# Patient Record
Sex: FEMALE | Race: WHITE | NOT HISPANIC OR LATINO
[De-identification: names, ages, dates, MRNs, and addresses within clinical notes are randomized per-mention and may not be internally consistent; named-entity substitution may affect disease eponyms.]

---

## 2017-01-18 ENCOUNTER — APPOINTMENT (OUTPATIENT)
Dept: OPHTHALMOLOGY | Facility: AMBULATORY SURGERY CENTER | Age: 82
End: 2017-01-18

## 2017-01-19 ENCOUNTER — APPOINTMENT (OUTPATIENT)
Dept: OPHTHALMOLOGY | Facility: CLINIC | Age: 82
End: 2017-01-19

## 2019-09-12 ENCOUNTER — EMERGENCY (EMERGENCY)
Facility: HOSPITAL | Age: 84
LOS: 1 days | Discharge: HOME | End: 2019-09-12
Admitting: EMERGENCY MEDICINE
Payer: MEDICARE

## 2019-09-12 VITALS
OXYGEN SATURATION: 98 % | DIASTOLIC BLOOD PRESSURE: 76 MMHG | WEIGHT: 123.02 LBS | RESPIRATION RATE: 16 BRPM | SYSTOLIC BLOOD PRESSURE: 137 MMHG | HEART RATE: 82 BPM | TEMPERATURE: 98 F

## 2019-09-12 VITALS
HEART RATE: 85 BPM | RESPIRATION RATE: 16 BRPM | SYSTOLIC BLOOD PRESSURE: 139 MMHG | OXYGEN SATURATION: 97 % | TEMPERATURE: 98 F | DIASTOLIC BLOOD PRESSURE: 75 MMHG
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
## 2019-09-12 DIAGNOSIS — S52.501A UNSPECIFIED FRACTURE OF THE LOWER END OF RIGHT RADIUS, INITIAL ENCOUNTER FOR CLOSED FRACTURE: ICD-10-CM
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
## 2019-09-12 DIAGNOSIS — M79.642 PAIN IN LEFT HAND: ICD-10-CM

## 2019-09-12 PROCEDURE — 73130 X-RAY EXAM OF HAND: CPT | Mod: 26,LT

## 2019-09-12 PROCEDURE — 73110 X-RAY EXAM OF WRIST: CPT | Mod: 26,LT,76

## 2019-09-12 PROCEDURE — 99212 OFFICE O/P EST SF 10 MIN: CPT

## 2019-09-12 PROCEDURE — 73130 X-RAY EXAM OF HAND: CPT | Mod: 26

## 2019-09-12 PROCEDURE — 73110 X-RAY EXAM OF WRIST: CPT

## 2019-09-12 PROCEDURE — 73110 X-RAY EXAM OF WRIST: CPT | Mod: 26

## 2019-09-12 PROCEDURE — 99284 EMERGENCY DEPT VISIT MOD MDM: CPT | Mod: 25

## 2019-09-12 PROCEDURE — 73130 X-RAY EXAM OF HAND: CPT

## 2019-09-12 PROCEDURE — 99285 EMERGENCY DEPT VISIT HI MDM: CPT | Mod: 25

## 2019-09-12 PROCEDURE — 25605 CLTX DST RDL FX/EPHYS SEP W/: CPT | Mod: LT

## 2019-09-12 RX ORDER — OXYCODONE AND ACETAMINOPHEN 5; 325 MG/1; MG/1
1 TABLET ORAL ONCE
Refills: 0 | Status: DISCONTINUED | OUTPATIENT
Start: 2019-09-12 | End: 2019-09-12

## 2019-09-12 RX ORDER — OXYCODONE AND ACETAMINOPHEN 5; 325 MG/1; MG/1
1 TABLET ORAL
Qty: 16 | Refills: 0
Start: 2019-09-12 | End: 2019-09-15

## 2019-09-12 RX ADMIN — OXYCODONE AND ACETAMINOPHEN 1 TABLET(S): 5; 325 TABLET ORAL at 12:56

## 2019-09-12 RX ADMIN — OXYCODONE AND ACETAMINOPHEN 1 TABLET(S): 5; 325 TABLET ORAL at 11:56

## 2019-09-12 NOTE — ED PROVIDER NOTE - CLINICAL SUMMARY MEDICAL DECISION MAKING FREE TEXT BOX
pt s/p FOOSH - injured L wrist - sustained complex fx hence ortho consulted for reduction, no head trauma or other injuries, neurovascular intact, to f/u w/ortho, will dc w/pain meds, pt understands to return for any swelling/bruising/numbness

## 2019-09-12 NOTE — ED PROVIDER NOTE - DIAGNOSTIC INTERPRETATION
wrist: + impacted distal radius fx, + ulnar styloid fx, + STS, no dislocation \  hand: + wrist fx as above, no metacarpal fx

## 2019-09-12 NOTE — ED PROVIDER NOTE - NSFOLLOWUPINSTRUCTIONS_ED_ALL_ED_FT
wrist fracture  splint must stay on until orthopedic follow up next week, rest, ice, elevate, pain medication as needed  return immediately for any bruising, swelling, numbness, worsening pain    R.I.C.E. Treatment  WHAT YOU NEED TO KNOW:  R.I.C.E. treatment is a 4-step process used to decrease swelling and pain caused by an injury. R.I.C.E. stands for rest, ice, compression, and elevation. R.I.C.E. should be done within 24 to 48 hours after an injury.Ice and Elevation  Ice and Elevation  DISCHARGE INSTRUCTIONS:  Return to the emergency department if:   Your pain is severe.  You have severe swelling or deformity.  You have numbness in the injured area.  Contact your healthcare provider if:  Your pain and swelling does not go away after a few days.  You have questions or concerns about your condition or care.  How to use R.I.C.E. treatment:   Rest your injured area as directed. You may need to stop using, or keep weight off, the injury for 48 hours or longer. Your healthcare provider may recommend crutches or another device. Return to your usual activities as directed.   Apply ice on your injured area for 15 to 20 minutes every 4 hours or as directed. Use an ice pack, or put crushed ice in a plastic bag. Cover it with a towel. Ice helps prevent tissue damage and decreases swelling and pain.  Compress, or keep pressure on, the injured area. Compression will help decrease swelling and support the injured area. Use an elastic bandage, air stirrup, splint, or sling as directed. If you use an elastic bandage to wrap your injured area, make sure the bandage is not too tight.   Elevate the injured area above the level of your heart as often as you can. This will help decrease swelling and pain. Prop the injured area on pillows or blankets to keep it elevated comfortably.

## 2019-09-12 NOTE — ED PROVIDER NOTE - PATIENT PORTAL LINK FT
You can access the FollowMyHealth Patient Portal offered by [facility] by registering at the following website: [email or web address]. By joining [employer]’s FollowMyHealth portal, you will also be able to view your health information using other applications (apps) compatible with our system.

## 2019-09-12 NOTE — ED PROVIDER NOTE - CARE PROVIDER_API CALL
Cralos Salmeron)  Orthopaedic Surgery  [address], [address] [place] [address]  Phone: [phone]  Fax: [phone]  Follow Up Time:

## 2019-09-12 NOTE — ED ADULT NURSE NOTE - NSIMPLEMENTINTERV_GEN_ALL_ED
Implemented All Universal Safety Interventions:  Jeffrey to call system. Call bell, personal items and telephone within reach. Instruct patient to call for assistance. Room bathroom lighting operational. Non-slip footwear when patient is off stretcher. Physically safe environment: no spills, clutter or unnecessary equipment. Stretcher in lowest position, wheels locked, appropriate side rails in place.

## 2019-09-12 NOTE — ED PROVIDER NOTE - OBJECTIVE STATEMENT
The pt is a 89 y/o F, who presents to ED c/o L wrist pain s/p STEWOSH last night. Pt states pain is 8/10, constant and throbbing, has not taken any pain meds, decreased ROM, is R hand dominant. Denies head trauma, loc, neck or back pain, dizziness prior to fall, numbness or tingling to fingers, elbow pain

## 2019-09-12 NOTE — CONSULT NOTE ADULT - SUBJECTIVE AND OBJECTIVE BOX
Orthopaedic Surgery Consult Note    For Surgeon: Dr. Salmeron    HPI:  89yFemale RHD c/o Left wrist pain s/p mechanical fall in apartment overnight. Hx Left distal radius fx years ago treated nonoperatively in cast. Pt states tripped on carpet overnight fell directly onto Left wrist. Took Ayla Aspirin for pain. Denies other injuries, numbness, tingling, head trauma, LOC.      Allergies    No Known Allergies      PAST MEDICAL & SURGICAL HISTORY:    MEDICATIONS  (STANDING):    MEDICATIONS  (PRN):      Vital Signs Last 24 Hrs  T(C): 36.7 (12 Sep 2019 14:43), Max: 36.7 (12 Sep 2019 14:43)  T(F): 98 (12 Sep 2019 14:43), Max: 98 (12 Sep 2019 14:43)  HR: 85 (12 Sep 2019 14:43) (62 - 85)  BP: 139/75 (12 Sep 2019 14:43) (137/76 - 145/52)  BP(mean): --  RR: 16 (12 Sep 2019 14:43) (16 - 16)  SpO2: 97% (12 Sep 2019 14:43) (97% - 99%)    Physical Exam:  Left wrist skin intact, +deformity, +swelling, +ecchymosis  +TTP distal radius  sensation intact distally  motor intact distally  pulses intact      Imaging: Xray Left wrist +distal radius & ulna styloid fx    Procedure: using aseptic technique injected 10cc 1% lidocaine into left wrist for hematoma block. reduction performed sugartong splint applied. pt tolerated procedure well, neurovasc intact afterwards.    A/P: 89yFemale with Left distal radius fx  -post reduction xrays reviewed  -sling/NWB  -ice/elevation  -pain control  -Discussed with Dr. Salmeron, f/u in office next week    Ortho Pager [phone]

## 2019-09-12 NOTE — ED ADULT NURSE NOTE - OBJECTIVE STATEMENT
Patient presents to the ED complaining of falling down yesterday night and landing on her left hand. Left handed noted to be swollen. Patient states that she had a break to the same hand a few years ago.

## 2019-09-12 NOTE — ED PROVIDER NOTE - MUSCULOSKELETAL, MLM
L UE: no shoulder or elbow tend, no forearm tend, + mod swelling and ecchymosis over dorsal wrist, + tend, + swelling over metacarpals, radial pulse 2+, able to wiggle all fingers

## 2023-06-20 ENCOUNTER — APPOINTMENT (OUTPATIENT)
Dept: OPHTHALMOLOGY | Facility: CLINIC | Age: 88
End: 2023-06-20
Payer: MEDICARE

## 2023-06-20 ENCOUNTER — NON-APPOINTMENT (OUTPATIENT)
Age: 88
End: 2023-06-20

## 2023-06-20 PROCEDURE — 92136 OPHTHALMIC BIOMETRY: CPT

## 2023-06-20 PROCEDURE — 92004 COMPRE OPH EXAM NEW PT 1/>: CPT

## 2023-06-20 PROCEDURE — 92134 CPTRZ OPH DX IMG PST SGM RTA: CPT

## 2023-06-29 ENCOUNTER — APPOINTMENT (OUTPATIENT)
Dept: OPHTHALMOLOGY | Facility: CLINIC | Age: 88
End: 2023-06-29
Payer: MEDICARE

## 2023-06-29 ENCOUNTER — NON-APPOINTMENT (OUTPATIENT)
Age: 88
End: 2023-06-29

## 2023-06-29 PROCEDURE — 92201 OPSCPY EXTND RTA DRAW UNI/BI: CPT

## 2023-06-29 PROCEDURE — 92134 CPTRZ OPH DX IMG PST SGM RTA: CPT

## 2023-06-29 PROCEDURE — 92014 COMPRE OPH EXAM EST PT 1/>: CPT

## 2023-07-05 ENCOUNTER — NON-APPOINTMENT (OUTPATIENT)
Age: 88
End: 2023-07-05

## 2023-07-05 ENCOUNTER — APPOINTMENT (OUTPATIENT)
Dept: OPHTHALMOLOGY | Facility: CLINIC | Age: 88
End: 2023-07-05
Payer: MEDICARE

## 2023-07-05 PROCEDURE — 92134 CPTRZ OPH DX IMG PST SGM RTA: CPT

## 2023-07-05 PROCEDURE — 92012 INTRM OPH EXAM EST PATIENT: CPT | Mod: 25

## 2023-07-05 PROCEDURE — 67028 INJECTION EYE DRUG: CPT | Mod: RT

## 2023-07-10 ENCOUNTER — NON-APPOINTMENT (OUTPATIENT)
Age: 88
End: 2023-07-10

## 2023-07-10 ENCOUNTER — APPOINTMENT (OUTPATIENT)
Dept: OPHTHALMOLOGY | Facility: CLINIC | Age: 88
End: 2023-07-10
Payer: MEDICARE

## 2023-07-10 PROCEDURE — 92134 CPTRZ OPH DX IMG PST SGM RTA: CPT

## 2023-07-10 PROCEDURE — 67028 INJECTION EYE DRUG: CPT | Mod: LT

## 2023-08-02 ENCOUNTER — APPOINTMENT (OUTPATIENT)
Dept: OPHTHALMOLOGY | Facility: CLINIC | Age: 88
End: 2023-08-02
Payer: MEDICARE

## 2023-08-02 ENCOUNTER — NON-APPOINTMENT (OUTPATIENT)
Age: 88
End: 2023-08-02

## 2023-08-02 PROCEDURE — 67028 INJECTION EYE DRUG: CPT | Mod: RT

## 2023-08-02 PROCEDURE — 92134 CPTRZ OPH DX IMG PST SGM RTA: CPT

## 2023-08-07 ENCOUNTER — NON-APPOINTMENT (OUTPATIENT)
Age: 88
End: 2023-08-07

## 2023-08-07 ENCOUNTER — APPOINTMENT (OUTPATIENT)
Dept: OPHTHALMOLOGY | Facility: CLINIC | Age: 88
End: 2023-08-07
Payer: MEDICARE

## 2023-08-07 PROCEDURE — 67028 INJECTION EYE DRUG: CPT | Mod: LT

## 2023-08-07 PROCEDURE — 92134 CPTRZ OPH DX IMG PST SGM RTA: CPT

## 2023-08-30 ENCOUNTER — NON-APPOINTMENT (OUTPATIENT)
Age: 88
End: 2023-08-30

## 2023-08-30 ENCOUNTER — APPOINTMENT (OUTPATIENT)
Dept: OPHTHALMOLOGY | Facility: CLINIC | Age: 88
End: 2023-08-30
Payer: MEDICARE

## 2023-08-30 PROCEDURE — 67028 INJECTION EYE DRUG: CPT | Mod: RT

## 2023-08-30 PROCEDURE — 92134 CPTRZ OPH DX IMG PST SGM RTA: CPT

## 2023-09-07 ENCOUNTER — NON-APPOINTMENT (OUTPATIENT)
Age: 88
End: 2023-09-07

## 2023-09-07 ENCOUNTER — APPOINTMENT (OUTPATIENT)
Dept: OPHTHALMOLOGY | Facility: CLINIC | Age: 88
End: 2023-09-07
Payer: MEDICARE

## 2023-09-07 PROCEDURE — 92134 CPTRZ OPH DX IMG PST SGM RTA: CPT

## 2023-09-07 PROCEDURE — 67028 INJECTION EYE DRUG: CPT | Mod: LT

## 2023-10-04 ENCOUNTER — APPOINTMENT (OUTPATIENT)
Dept: OPHTHALMOLOGY | Facility: CLINIC | Age: 88
End: 2023-10-04

## 2023-10-09 ENCOUNTER — APPOINTMENT (OUTPATIENT)
Dept: OPHTHALMOLOGY | Facility: CLINIC | Age: 88
End: 2023-10-09
Payer: MEDICARE

## 2023-10-09 ENCOUNTER — NON-APPOINTMENT (OUTPATIENT)
Age: 88
End: 2023-10-09

## 2023-10-09 PROCEDURE — 92012 INTRM OPH EXAM EST PATIENT: CPT

## 2023-10-11 ENCOUNTER — NON-APPOINTMENT (OUTPATIENT)
Age: 88
End: 2023-10-11

## 2023-10-11 ENCOUNTER — APPOINTMENT (OUTPATIENT)
Dept: OPHTHALMOLOGY | Facility: CLINIC | Age: 88
End: 2023-10-11
Payer: MEDICARE

## 2023-10-11 PROCEDURE — 92134 CPTRZ OPH DX IMG PST SGM RTA: CPT

## 2023-10-11 PROCEDURE — 67028 INJECTION EYE DRUG: CPT | Mod: LT

## 2023-10-26 ENCOUNTER — NON-APPOINTMENT (OUTPATIENT)
Age: 88
End: 2023-10-26

## 2023-10-26 ENCOUNTER — APPOINTMENT (OUTPATIENT)
Dept: OPHTHALMOLOGY | Facility: CLINIC | Age: 88
End: 2023-10-26
Payer: MEDICARE

## 2023-10-26 PROCEDURE — 92134 CPTRZ OPH DX IMG PST SGM RTA: CPT

## 2023-10-26 PROCEDURE — 67028 INJECTION EYE DRUG: CPT | Mod: RT

## 2023-11-29 ENCOUNTER — APPOINTMENT (OUTPATIENT)
Dept: OPHTHALMOLOGY | Facility: CLINIC | Age: 88
End: 2023-11-29
Payer: MEDICARE

## 2023-11-29 ENCOUNTER — NON-APPOINTMENT (OUTPATIENT)
Age: 88
End: 2023-11-29

## 2023-11-29 PROCEDURE — 92014 COMPRE OPH EXAM EST PT 1/>: CPT

## 2023-12-07 ENCOUNTER — APPOINTMENT (OUTPATIENT)
Dept: OPHTHALMOLOGY | Facility: CLINIC | Age: 88
End: 2023-12-07
Payer: MEDICARE

## 2023-12-07 ENCOUNTER — NON-APPOINTMENT (OUTPATIENT)
Age: 88
End: 2023-12-07

## 2023-12-07 PROCEDURE — 67028 INJECTION EYE DRUG: CPT | Mod: LT

## 2023-12-07 PROCEDURE — 92012 INTRM OPH EXAM EST PATIENT: CPT | Mod: 25

## 2023-12-07 PROCEDURE — 92134 CPTRZ OPH DX IMG PST SGM RTA: CPT

## 2023-12-14 ENCOUNTER — APPOINTMENT (OUTPATIENT)
Dept: OPHTHALMOLOGY | Facility: CLINIC | Age: 88
End: 2023-12-14
Payer: MEDICARE

## 2023-12-14 ENCOUNTER — NON-APPOINTMENT (OUTPATIENT)
Age: 88
End: 2023-12-14

## 2023-12-14 PROCEDURE — 92134 CPTRZ OPH DX IMG PST SGM RTA: CPT

## 2023-12-14 PROCEDURE — 67028 INJECTION EYE DRUG: CPT | Mod: RT

## 2023-12-20 ENCOUNTER — APPOINTMENT (OUTPATIENT)
Dept: OPHTHALMOLOGY | Facility: AMBULATORY SURGERY CENTER | Age: 88
End: 2023-12-20

## 2023-12-21 ENCOUNTER — APPOINTMENT (OUTPATIENT)
Dept: OPHTHALMOLOGY | Facility: CLINIC | Age: 88
End: 2023-12-21

## 2024-01-03 ENCOUNTER — APPOINTMENT (OUTPATIENT)
Dept: OPHTHALMOLOGY | Facility: AMBULATORY SURGERY CENTER | Age: 89
End: 2024-01-03

## 2024-01-04 ENCOUNTER — APPOINTMENT (OUTPATIENT)
Dept: OPHTHALMOLOGY | Facility: CLINIC | Age: 89
End: 2024-01-04

## 2024-02-02 NOTE — ASU PATIENT PROFILE, ADULT - NSICDXPASTMEDICALHX_GEN_ALL_CORE_FT
PAST MEDICAL HISTORY:  HLD (hyperlipidemia) no meds    HTN (hypertension)     Left wrist fracture      PAST MEDICAL HISTORY:  HLD (hyperlipidemia) no meds    HTN (hypertension)     Left wrist fracture     Macular degeneration

## 2024-02-02 NOTE — ASU PATIENT PROFILE, ADULT - NS PREOP UNDERSTANDS INFO
No solid food/dairy/candy/gum after midnight Sunday; water allowed before 09:30am Monday; patient reminded to come with photo ID/insurance/credit card; dress in comfortable clothes; no jewelries/contact lens/valuable; no smoking/alcohol drinking/recreational drug use Sunday; escort to have photo ID; address and callback number was given;/yes

## 2024-02-05 ENCOUNTER — OUTPATIENT (OUTPATIENT)
Dept: OUTPATIENT SERVICES | Facility: HOSPITAL | Age: 89
LOS: 1 days | Discharge: HOME | End: 2024-02-05
Payer: MEDICARE

## 2024-02-05 ENCOUNTER — NON-APPOINTMENT (OUTPATIENT)
Age: 89
End: 2024-02-05

## 2024-02-05 ENCOUNTER — APPOINTMENT (OUTPATIENT)
Dept: OPHTHALMOLOGY | Facility: AMBULATORY SURGERY CENTER | Age: 89
End: 2024-02-05

## 2024-02-05 ENCOUNTER — TRANSCRIPTION ENCOUNTER (OUTPATIENT)
Age: 89
End: 2024-02-05

## 2024-02-05 VITALS
HEART RATE: 52 BPM | DIASTOLIC BLOOD PRESSURE: 64 MMHG | TEMPERATURE: 98 F | OXYGEN SATURATION: 99 % | SYSTOLIC BLOOD PRESSURE: 156 MMHG | RESPIRATION RATE: 16 BRPM

## 2024-02-05 VITALS
DIASTOLIC BLOOD PRESSURE: 70 MMHG | WEIGHT: 108.91 LBS | RESPIRATION RATE: 16 BRPM | OXYGEN SATURATION: 97 % | SYSTOLIC BLOOD PRESSURE: 139 MMHG | HEART RATE: 73 BPM | TEMPERATURE: 98 F | HEIGHT: 60 IN

## 2024-02-05 PROCEDURE — 66984 XCAPSL CTRC RMVL W/O ECP: CPT | Mod: RT

## 2024-02-05 DEVICE — LENS IOL TECNIS PROTEC ZCB00 19.5D
Type: IMPLANTABLE DEVICE | Site: RIGHT | Status: NON-FUNCTIONAL
Removed: 2024-02-05

## 2024-02-05 RX ORDER — ACETAMINOPHEN 500 MG
650 TABLET ORAL ONCE
Refills: 0 | Status: DISCONTINUED | OUTPATIENT
Start: 2024-02-05 | End: 2024-02-05

## 2024-02-05 RX ORDER — TROPICAMIDE 1 %
1 DROPS OPHTHALMIC (EYE)
Refills: 0 | Status: COMPLETED | OUTPATIENT
Start: 2024-02-05 | End: 2024-02-05

## 2024-02-05 RX ORDER — PHENYLEPHRINE HCL 2.5 %
1 DROPS OPHTHALMIC (EYE)
Refills: 0 | Status: COMPLETED | OUTPATIENT
Start: 2024-02-05 | End: 2024-02-05

## 2024-02-05 RX ORDER — POLYMYXIN B SULF/TRIMETHOPRIM 10000-1/ML
1 DROPS OPHTHALMIC (EYE)
Refills: 0 | Status: COMPLETED | OUTPATIENT
Start: 2024-02-05 | End: 2024-02-05

## 2024-02-05 RX ORDER — SODIUM CHLORIDE 9 MG/ML
1000 INJECTION, SOLUTION INTRAVENOUS
Refills: 0 | Status: DISCONTINUED | OUTPATIENT
Start: 2024-02-05 | End: 2024-02-05

## 2024-02-05 RX ORDER — KETOROLAC TROMETHAMINE 0.5 %
1 DROPS OPHTHALMIC (EYE)
Refills: 0 | Status: COMPLETED | OUTPATIENT
Start: 2024-02-05 | End: 2024-02-05

## 2024-02-05 RX ORDER — AMLODIPINE BESYLATE 2.5 MG/1
1 TABLET ORAL
Refills: 0 | DISCHARGE

## 2024-02-05 RX ADMIN — Medication 1 DROP(S): at 12:20

## 2024-02-05 RX ADMIN — Medication 1 DROP(S): at 12:10

## 2024-02-05 RX ADMIN — Medication 1 DROP(S): at 12:15

## 2024-02-05 NOTE — OPERATIVE REPORT - OPERATIVE RPOSRT DETAILS
SURGEON:  JULIO VIRAMONTES M.D.    PREOPERATIVE DIAGNOSIS:  CATARACT RIGHT EYE    POSTOPERATIVE DIAGNOSIS:  CATARACT RIGHT EYE    DATE OF SURGERY:  2/5/24    SURGICAL PROCEDURE:  CATARACT REMOVAL WITH LENS IMPLANT    LATERALITY:  RIGHT EYE    COMPLEXITY:  NORMAL    IMPLANT USED:  ZCBOO 19.50 DIOPTERS    COMPLICATIONS:  NONE    ESTIMATED BLOOD LOSS: <1 cc    PROCEDURE:  THE PATIENT WAS BROUGHT TO THE OPERATING ROOM AFTER THE SURGICAL EYE WAS IDENTIFIED AND MARKED IN THE PREOPERATIVE HOLDING AREA.  ONCE IN THE O.R. THE PATIENT AND SURGICAL SITE WERE AGAIN IDENTIFIED BY ALL O.R. PERSONNEL DURING A FULL TIME OUT.   THE AREA ABOUT THE SURGICAL EYE WAS PREPPED AND DRAPED IN THE USUAL STERILE FASHION USING SOLUTIONS OF BETADINE AND ALCOHOL.   TOPICAL ANESTHESIA WAS INDUCED USING TETRACAINE.  A STERILE LID SPECULUM WAS PLACED BENEATH THE LIDS OF THE SURGICAL EYE.  IT WAS REMOVED PRIOR TO THE END OF THE PROCEDURE.  A 1 ML SUBCONJUNCTIVAL INJECTION OF LIDOCAINE 2% WITH EPINEPHRINE WAS ADMINISTERED SUPERIORLY AND A 4 MM AREA OF CONJUNCTIVA WAS OPENED AT THE LIMBUS USING A JOAN SCISSORS.   WET FIELD ELECTROCAUTERY WAS USED TO ACHIEVE HEMOSTASIS.   A SCLERAL INCISION WAS CREATED 1MM POSTERIOR TO THE SURGICAL LIMBUS USING A CRESCENT BLADE.   DISSECTION WAS TAKEN ANTERIORLY INTO CLEAR CORNEA WITHOUT ENTERING THE ANTERIOR CHAMBER.   NASAL AND TEMPORAL PERIPHERAL CORNEAL PARACENTESIS PORTS WERE CREATED WITH A 15 DEGREE SUPER BLADE.  INTRAOCULAR PRESERVATIVE FREE LIDOCAINE WAS INJECTED INTO THE ANTERIOR CHAMBER FOLLOWED BY INTRAOCULAR EPINEPHRINE.  AMVISC PLUS WAS INJECTED INTO THE ANTERIOR CHAMBER AND THE SCLERAL TUNNEL WAS COMPLETED USING A 2.4 MM BENT KERATOME.   A CONTINUOUS TEAR CAPSULORRHEXIS WAS THEN CREATED USING A CYSTITOME.   THIS WAS COMPLETED USING UTRATA FORCEPS.   HYDRODISSECTION WAN THEN PERFORMED.   A DIVIDE AND CONQUOR TECHNIQUE WAS THEN USED WITH THE PHACOEMULSIFICATION HANDPIECE TO REMOVE THE NUCLEUS OF THE CATARACT.   REMAINING CORTICAL FIBERS WERE REMOVED USING AUTOMATED IRRIGATION AND ASPIRATION.  THE CAPSULAR BAG WAS INSPECTED AND FOUND TO BE INTACT.  IT WAS FILLED WITH HEALON.  A POSTERIOR CHAMBER LENS WAS PLACED INTO THE CAPSULAR BAG AND ROTATED INTO POSITION.   IT WAS FOUND TO SPONTANEOUSLY CENTER.    REMAINING HEALON WAS REMOVED THOROUGHLY USING AUTOMATED IRRIGATION AND ASPIRATION.   THE SURGICAL WOUNDS WERE HYDRATED AND A SINGLE 10-0 NYLON STITCH WAS PLACED AT THE MAIN ENTRY SITE.  ALL WOUNDS WERE TESTED AND FOUND TO BE WATER TIGHT.   TOBRADEX EYE DROPS WERE PLACED ON THE CORNEA AND THE LID SPECULUM WAS REMOVED.  ERYTHROMYCIN OINTMENT WAS PLACED BENEATH A STERILE PATCH AND SHIELD.   THE PATIENT LEFT THE OPERATING ROOM IN STABLE CONDITION HAVING TOLERATED THE PROCEDURE WELL.

## 2024-02-06 ENCOUNTER — APPOINTMENT (OUTPATIENT)
Dept: OPHTHALMOLOGY | Facility: CLINIC | Age: 89
End: 2024-02-06
Payer: MEDICARE

## 2024-02-06 ENCOUNTER — NON-APPOINTMENT (OUTPATIENT)
Age: 89
End: 2024-02-06

## 2024-02-06 PROCEDURE — 99024 POSTOP FOLLOW-UP VISIT: CPT

## 2024-02-14 ENCOUNTER — NON-APPOINTMENT (OUTPATIENT)
Age: 89
End: 2024-02-14

## 2024-02-14 ENCOUNTER — APPOINTMENT (OUTPATIENT)
Dept: OPHTHALMOLOGY | Facility: CLINIC | Age: 89
End: 2024-02-14
Payer: MEDICARE

## 2024-02-14 PROBLEM — E78.5 HYPERLIPIDEMIA, UNSPECIFIED: Chronic | Status: ACTIVE | Noted: 2024-02-05

## 2024-02-14 PROBLEM — H35.30 UNSPECIFIED MACULAR DEGENERATION: Chronic | Status: ACTIVE | Noted: 2024-02-05

## 2024-02-14 PROBLEM — I10 ESSENTIAL (PRIMARY) HYPERTENSION: Chronic | Status: ACTIVE | Noted: 2024-02-05

## 2024-02-14 PROBLEM — S62.102A FRACTURE OF UNSPECIFIED CARPAL BONE, LEFT WRIST, INITIAL ENCOUNTER FOR CLOSED FRACTURE: Chronic | Status: ACTIVE | Noted: 2024-02-05

## 2024-02-14 PROCEDURE — 99024 POSTOP FOLLOW-UP VISIT: CPT

## 2024-02-21 ENCOUNTER — APPOINTMENT (OUTPATIENT)
Dept: OPHTHALMOLOGY | Facility: CLINIC | Age: 89
End: 2024-02-21
Payer: MEDICARE

## 2024-02-21 ENCOUNTER — NON-APPOINTMENT (OUTPATIENT)
Age: 89
End: 2024-02-21

## 2024-02-21 PROCEDURE — 92012 INTRM OPH EXAM EST PATIENT: CPT | Mod: 25,24

## 2024-02-21 PROCEDURE — 67028 INJECTION EYE DRUG: CPT | Mod: 79,LT

## 2024-02-21 PROCEDURE — 92134 CPTRZ OPH DX IMG PST SGM RTA: CPT

## 2024-03-05 ENCOUNTER — APPOINTMENT (OUTPATIENT)
Dept: OPHTHALMOLOGY | Facility: CLINIC | Age: 89
End: 2024-03-05

## 2024-03-11 ENCOUNTER — APPOINTMENT (OUTPATIENT)
Dept: OPHTHALMOLOGY | Facility: CLINIC | Age: 89
End: 2024-03-11

## 2024-03-13 ENCOUNTER — NON-APPOINTMENT (OUTPATIENT)
Age: 89
End: 2024-03-13

## 2024-03-13 ENCOUNTER — APPOINTMENT (OUTPATIENT)
Dept: OPHTHALMOLOGY | Facility: CLINIC | Age: 89
End: 2024-03-13
Payer: MEDICARE

## 2024-03-13 PROCEDURE — 92012 INTRM OPH EXAM EST PATIENT: CPT | Mod: 24

## 2024-03-18 ENCOUNTER — NON-APPOINTMENT (OUTPATIENT)
Age: 89
End: 2024-03-18

## 2024-03-18 ENCOUNTER — APPOINTMENT (OUTPATIENT)
Dept: OPHTHALMOLOGY | Facility: CLINIC | Age: 89
End: 2024-03-18
Payer: MEDICARE

## 2024-03-18 PROCEDURE — 67028 INJECTION EYE DRUG: CPT | Mod: RT

## 2024-03-18 PROCEDURE — 92134 CPTRZ OPH DX IMG PST SGM RTA: CPT

## 2024-04-10 ENCOUNTER — APPOINTMENT (OUTPATIENT)
Dept: OPHTHALMOLOGY | Facility: CLINIC | Age: 89
End: 2024-04-10

## 2024-06-26 ENCOUNTER — APPOINTMENT (OUTPATIENT)
Dept: OPHTHALMOLOGY | Facility: CLINIC | Age: 89
End: 2024-06-26
Payer: MEDICARE

## 2024-06-26 ENCOUNTER — NON-APPOINTMENT (OUTPATIENT)
Age: 89
End: 2024-06-26

## 2024-06-26 PROCEDURE — 67028 INJECTION EYE DRUG: CPT | Mod: LT

## 2024-06-26 PROCEDURE — 92012 INTRM OPH EXAM EST PATIENT: CPT | Mod: 25

## 2024-06-26 PROCEDURE — 92134 CPTRZ OPH DX IMG PST SGM RTA: CPT

## 2024-07-10 ENCOUNTER — NON-APPOINTMENT (OUTPATIENT)
Age: 89
End: 2024-07-10

## 2024-07-10 ENCOUNTER — APPOINTMENT (OUTPATIENT)
Dept: OPHTHALMOLOGY | Facility: CLINIC | Age: 89
End: 2024-07-10
Payer: MEDICARE

## 2024-07-10 PROCEDURE — 67028 INJECTION EYE DRUG: CPT | Mod: RT

## 2024-07-10 PROCEDURE — 92134 CPTRZ OPH DX IMG PST SGM RTA: CPT

## 2024-07-19 ENCOUNTER — APPOINTMENT (OUTPATIENT)
Dept: OPHTHALMOLOGY | Facility: CLINIC | Age: 89
End: 2024-07-19

## 2024-08-07 ENCOUNTER — APPOINTMENT (OUTPATIENT)
Dept: OPHTHALMOLOGY | Facility: CLINIC | Age: 89
End: 2024-08-07

## 2024-08-07 ENCOUNTER — NON-APPOINTMENT (OUTPATIENT)
Age: 89
End: 2024-08-07

## 2024-08-07 PROCEDURE — 92012 INTRM OPH EXAM EST PATIENT: CPT

## 2024-09-18 ENCOUNTER — APPOINTMENT (OUTPATIENT)
Dept: OPHTHALMOLOGY | Facility: CLINIC | Age: 89
End: 2024-09-18
Payer: MEDICARE

## 2024-09-18 ENCOUNTER — NON-APPOINTMENT (OUTPATIENT)
Age: 89
End: 2024-09-18

## 2024-09-18 PROCEDURE — 92134 CPTRZ OPH DX IMG PST SGM RTA: CPT

## 2024-09-18 PROCEDURE — 67028 INJECTION EYE DRUG: CPT | Mod: LT

## 2024-09-18 PROCEDURE — 92012 INTRM OPH EXAM EST PATIENT: CPT | Mod: 25

## 2024-10-30 ENCOUNTER — APPOINTMENT (OUTPATIENT)
Dept: OPHTHALMOLOGY | Facility: CLINIC | Age: 89
End: 2024-10-30

## 2024-12-05 NOTE — ASU PREOP CHECKLIST - AS TEMP SITE
ED SIGN OUT NOTE  Care assumed at [facility] 7:04 AM EST    Patient was signed out to me by Dr. Lindsey.     Patient is awaiting Transfer.    BP (!) 152/106   Pulse 90   Temp 99.4 °F (37.4 °C) (Oral)   Resp 16   Ht 1.626 m (5' 4\")   Wt 56.7 kg (125 lb)   SpO2 90%   BMI 21.46 kg/m²     Labs Reviewed   CBC WITH AUTO DIFFERENTIAL - Abnormal; Notable for the following components:       Result Value    WBC 11.1 (*)     RDW 14.7 (*)     Nucleated RBCs 0.3 (*)     nRBC 0.03 (*)     Immature Granulocytes % 3 (*)     Neutrophils Absolute 8.1 (*)     Immature Granulocytes Absolute 0.3 (*)     All other components within normal limits   COMPREHENSIVE METABOLIC PANEL - Abnormal; Notable for the following components:    Sodium 131 (*)     Chloride 96 (*)     Glucose 103 (*)     Total Bilirubin 1.2 (*)      (*)      (*)     Alk Phosphatase 769 (*)     Albumin 2.9 (*)     Globulin 4.6 (*)     Albumin/Globulin Ratio 0.6 (*)     All other components within normal limits   AMMONIA - Abnormal; Notable for the following components:    Ammonia 40 (*)     All other components within normal limits   URINALYSIS WITH REFLEX TO CULTURE - Abnormal; Notable for the following components:    Protein, UA TRACE (*)     Glucose, Ur 100 (*)     Urobilinogen, Urine 4.0 (*)     Epithelial Cells, UA MANY (*)     All other components within normal limits   BILIRUBIN, CONFIRMATORY - Abnormal; Notable for the following components:    Bilirubin Confirmation, UA Positive (*)     All other components within normal limits   CULTURE, BLOOD 1   ETHANOL   MAGNESIUM   LACTIC ACID     CT CHEST W CONTRAST   Final Result   5.4 cm left perihilar mass with confluent mediastinal lymphadenopathy,   compatible with primary pulmonary malignancy. Mass effect on left-sided bronchi   and left main pulmonary artery. Patchy airspace disease in the left upper and   lower lobes likely represents postobstructive pneumonitis. Innumerable  forehead

## 2024-12-18 ENCOUNTER — NON-APPOINTMENT (OUTPATIENT)
Age: 88
End: 2024-12-18

## 2024-12-18 ENCOUNTER — APPOINTMENT (OUTPATIENT)
Dept: OPHTHALMOLOGY | Facility: CLINIC | Age: 88
End: 2024-12-18
Payer: MEDICARE

## 2024-12-18 PROCEDURE — 92134 CPTRZ OPH DX IMG PST SGM RTA: CPT

## 2024-12-18 PROCEDURE — 67028 INJECTION EYE DRUG: CPT | Mod: LT

## 2024-12-18 PROCEDURE — 92012 INTRM OPH EXAM EST PATIENT: CPT | Mod: 25

## 2025-01-03 NOTE — ED ADULT NURSE NOTE - CHIEF COMPLAINT QUOTE
Medication: Zepbound  passed protocol.   Last office visit date: 07/17/2024  Next appointment scheduled?: Yes   Number of refills given: 0     "I hurt my wrist last night"

## 2025-02-03 ENCOUNTER — APPOINTMENT (OUTPATIENT)
Dept: OPHTHALMOLOGY | Facility: CLINIC | Age: 89
End: 2025-02-03

## 2025-02-12 ENCOUNTER — APPOINTMENT (OUTPATIENT)
Dept: OPHTHALMOLOGY | Facility: CLINIC | Age: 89
End: 2025-02-12

## 2025-03-19 ENCOUNTER — NON-APPOINTMENT (OUTPATIENT)
Age: 89
End: 2025-03-19

## 2025-03-19 ENCOUNTER — APPOINTMENT (OUTPATIENT)
Dept: OPHTHALMOLOGY | Facility: CLINIC | Age: 89
End: 2025-03-19
Payer: MEDICARE

## 2025-03-19 PROCEDURE — 67028 INJECTION EYE DRUG: CPT | Mod: LT

## 2025-03-19 PROCEDURE — 92012 INTRM OPH EXAM EST PATIENT: CPT | Mod: 25

## 2025-03-19 PROCEDURE — 92134 CPTRZ OPH DX IMG PST SGM RTA: CPT

## 2025-06-13 ENCOUNTER — NON-APPOINTMENT (OUTPATIENT)
Age: 89
End: 2025-06-13

## 2025-06-13 ENCOUNTER — APPOINTMENT (OUTPATIENT)
Dept: OPHTHALMOLOGY | Facility: CLINIC | Age: 89
End: 2025-06-13

## 2025-06-13 PROCEDURE — 92012 INTRM OPH EXAM EST PATIENT: CPT

## 2025-07-02 ENCOUNTER — APPOINTMENT (OUTPATIENT)
Dept: OPHTHALMOLOGY | Facility: CLINIC | Age: 89
End: 2025-07-02

## (undated) DEVICE — PREP BETADINE 5% STERILE OPTHALMIC SOLUTION

## (undated) DEVICE — KIT CENTURION ANTERIOR

## (undated) DEVICE — TRANSFORMER INTREPID I/A 0.3MM

## (undated) DEVICE — TIP OZIL 12 DEGREE MINI FLARE

## (undated) DEVICE — NDL HYPO SAFE 27G X 5/8" (GRAY)

## (undated) DEVICE — Device

## (undated) DEVICE — DRSG STERISTRIPS 0.5 X 4"

## (undated) DEVICE — KNIFE ALCON STANDARD FULL HANDLE 15 DEG (PINK)

## (undated) DEVICE — SUT NYLON 10-0 12" CU-5

## (undated) DEVICE — PACK ANTERIOR SEGMENT

## (undated) DEVICE — KNIFE ALCON CRESCENT ANGLED BEVEL UP 2.3MM (PINK)

## (undated) DEVICE — DRAPE MICROSCOPE KNOB COVER SMALL (2 PCS)

## (undated) DEVICE — PACK CENTURION 2.4MM

## (undated) DEVICE — KNIFE ALCON SLIT INTREPID CLEAR-CUT SAFETY 2.4MM

## (undated) DEVICE — ELCTR BIPOLAR CORD J&J 12FT DISP

## (undated) DEVICE — GLV 7.5 PROTEXIS (WHITE)

## (undated) DEVICE — ELCTR ERASER BI-P BVL 45DEG 18G